# Patient Record
Sex: FEMALE | Race: WHITE
[De-identification: names, ages, dates, MRNs, and addresses within clinical notes are randomized per-mention and may not be internally consistent; named-entity substitution may affect disease eponyms.]

---

## 2021-12-07 ENCOUNTER — HOSPITAL ENCOUNTER (EMERGENCY)
Dept: HOSPITAL 19 - COL.ER | Age: 46
End: 2021-12-07
Attending: EMERGENCY MEDICINE
Payer: COMMERCIAL

## 2021-12-07 VITALS — WEIGHT: 195.33 LBS | HEIGHT: 67.01 IN | BODY MASS INDEX: 30.66 KG/M2

## 2021-12-07 VITALS — HEART RATE: 82 BPM | DIASTOLIC BLOOD PRESSURE: 86 MMHG | SYSTOLIC BLOOD PRESSURE: 143 MMHG

## 2021-12-07 VITALS — TEMPERATURE: 98.4 F

## 2021-12-07 DIAGNOSIS — R55: Primary | ICD-10-CM

## 2021-12-07 LAB
ALBUMIN SERPL-MCNC: 3.9 GM/DL (ref 3.5–5)
ALP SERPL-CCNC: 65 U/L (ref 40–150)
ALT SERPL-CCNC: 30 U/L (ref 0–55)
ANION GAP SERPL CALC-SCNC: 9 MMOL/L (ref 7–16)
AST SERPL-CCNC: 18 U/L (ref 5–34)
BASOPHILS # BLD: 0 K/MM3 (ref 0–0.2)
BASOPHILS NFR BLD AUTO: 0.3 % (ref 0–2)
BILIRUB SERPL-MCNC: 0.3 MG/DL (ref 0.2–1.2)
BUN SERPL-MCNC: 14 MG/DL (ref 7–19)
CALCIUM SERPL-MCNC: 8.6 MG/DL (ref 8.4–10.2)
CHLORIDE SERPL-SCNC: 106 MMOL/L (ref 98–107)
CO2 SERPL-SCNC: 25 MMOL/L (ref 22–29)
CREAT SERPL-SCNC: 0.73 MG/DL (ref 0.57–1.11)
EOSINOPHIL # BLD: 0.1 K/MM3 (ref 0–0.7)
EOSINOPHIL NFR BLD: 1.5 % (ref 0–4)
ERYTHROCYTE [DISTWIDTH] IN BLOOD BY AUTOMATED COUNT: 13.6 % (ref 11.5–14.5)
GLUCOSE SERPL-MCNC: 89 MG/DL (ref 70–99)
GRANULOCYTES # BLD AUTO: 61 % (ref 42.2–75.2)
HCT VFR BLD AUTO: 38.1 % (ref 37–47)
HGB BLD-MCNC: 12.6 G/DL (ref 12.5–16)
LYMPHOCYTES # BLD: 2.7 K/MM3 (ref 1.2–3.4)
LYMPHOCYTES NFR BLD: 29.4 % (ref 20–51)
MCH RBC QN AUTO: 30 PG (ref 27–31)
MCHC RBC AUTO-ENTMCNC: 33 G/DL (ref 33–37)
MCV RBC AUTO: 89 FL (ref 80–100)
MONOCYTES # BLD: 0.7 K/MM3 (ref 0.1–0.6)
MONOCYTES NFR BLD AUTO: 7.6 % (ref 1.7–9.3)
NEUTROPHILS # BLD: 5.7 K/MM3 (ref 1.4–6.5)
PLATELET # BLD AUTO: 293 K/MM3 (ref 130–400)
PMV BLD AUTO: 10.1 FL (ref 7.4–10.4)
POTASSIUM SERPL-SCNC: 4.2 MMOL/L (ref 3.5–4.5)
PROT SERPL-MCNC: 7.3 GM/DL (ref 6.2–8.1)
RBC # BLD AUTO: 4.27 M/MM3 (ref 4.1–5.3)
SODIUM SERPL-SCNC: 140 MMOL/L (ref 136–145)

## 2022-09-19 ENCOUNTER — HOSPITAL ENCOUNTER (OUTPATIENT)
Dept: HOSPITAL 19 - MC.RAD | Age: 47
End: 2022-09-19
Attending: FAMILY MEDICINE
Payer: COMMERCIAL

## 2022-09-19 DIAGNOSIS — N63.25: Primary | ICD-10-CM

## 2022-09-28 ENCOUNTER — HOSPITAL ENCOUNTER (OUTPATIENT)
Dept: HOSPITAL 19 - MC.RAD | Age: 47
End: 2022-09-28
Attending: FAMILY MEDICINE
Payer: COMMERCIAL

## 2022-09-28 DIAGNOSIS — N63.20: Primary | ICD-10-CM

## 2024-05-08 ENCOUNTER — HOSPITAL ENCOUNTER (OUTPATIENT)
Dept: HOSPITAL 19 - MC.RAD | Age: 49
End: 2024-05-08
Payer: COMMERCIAL

## 2024-05-08 DIAGNOSIS — Z12.31: Primary | ICD-10-CM

## 2024-06-12 ENCOUNTER — HOSPITAL ENCOUNTER (OUTPATIENT)
Dept: HOSPITAL 19 - SDCO | Age: 49
Discharge: HOME | End: 2024-06-12
Attending: SURGERY
Payer: COMMERCIAL

## 2024-06-12 DIAGNOSIS — K80.10: Primary | ICD-10-CM

## 2024-06-12 DIAGNOSIS — Z53.9: ICD-10-CM

## 2024-06-12 NOTE — NUR
MEDICATION LIST REVIEWED WITH THE PATIENT.  PATIENT TOOK PHENTERMINE THIS AM
AND HAD NOT BEEN INSTRUCTED TO STOP THIS MEDICATION.  KAREN GOMEZ CRNA WAS
NOTIFIED AND WILL COME AND TALK WITH THE PATIENT.
 
1230 DECISION WAS MADE BY DR. TREJO AND KAREN GOMEZ CRNA TO CANCEL THE
PATIENT D/T WEIGHT LOSS DRUG BEING TAKEN.  PATIENT WAS INFORMED BY ANESTHESIA.
PATIENT AND SPOUSE BOTH VERBALZIE UNDERSTANDING OF THIS.  DR. TREJO WILL
COME AND TALK WITH PATIENT AFTER FINISHING CASE IN THE SURGERY.

## 2024-06-12 NOTE — NUR
DR. TREJO IN THE ROOM AND SURGERY CANCELLED FOR TODAY.  PATIENT WAS
DISCHARGED TO HOME AND INFORMED THE OFFICE WILL CALL WITH RESCHEDULE DATE.

## 2024-06-21 ENCOUNTER — HOSPITAL ENCOUNTER (OUTPATIENT)
Dept: HOSPITAL 19 - SDCO | Age: 49
Discharge: HOME | End: 2024-06-21
Attending: SURGERY
Payer: COMMERCIAL

## 2024-06-21 VITALS — HEIGHT: 67 IN | WEIGHT: 210.54 LBS | BODY MASS INDEX: 33.04 KG/M2

## 2024-06-21 VITALS — DIASTOLIC BLOOD PRESSURE: 64 MMHG | TEMPERATURE: 97.6 F | HEART RATE: 53 BPM | SYSTOLIC BLOOD PRESSURE: 120 MMHG

## 2024-06-21 VITALS — DIASTOLIC BLOOD PRESSURE: 79 MMHG | SYSTOLIC BLOOD PRESSURE: 117 MMHG | HEART RATE: 60 BPM

## 2024-06-21 VITALS — SYSTOLIC BLOOD PRESSURE: 129 MMHG | DIASTOLIC BLOOD PRESSURE: 85 MMHG | HEART RATE: 65 BPM

## 2024-06-21 VITALS — DIASTOLIC BLOOD PRESSURE: 77 MMHG | TEMPERATURE: 98.1 F | HEART RATE: 63 BPM | SYSTOLIC BLOOD PRESSURE: 121 MMHG

## 2024-06-21 DIAGNOSIS — K21.9: ICD-10-CM

## 2024-06-21 DIAGNOSIS — K80.10: Primary | ICD-10-CM

## 2024-06-21 DIAGNOSIS — Z79.899: ICD-10-CM

## 2024-06-21 NOTE — NUR
PATIENT WAS ADMITTED TO ROOM 5 AMBULATORY AND IS ALERT AND ORIENTED X3.
VOICED UNDERSTANDING OF SURGERY AND CONSENT SIGNED.  URINE HCG WAS OBTAINED.
SPOUSE IN ROOM.  CALL LIGHT IN REACH.

## 2024-06-21 NOTE — NUR
1520: DISCHARGE TEACHING COMPLETED WITH PRINTED EDUCATION AND INSTRUCTIONS
SENT HOME WITH PATIENT AND SPOUSE. FOLLOW UP APPOINTMENT DATE, TIME AND
LOCATION COMMUNICATED TO PATIENT AND SPOUSE. BOTH VERBALIZED UNDERSTANDING.
1522: PATIENT WALKED IN HALLWAY WITH STEADY GAIT.
1526: PATIENT VOIDED WITHOUT DIFFICULTY IN RESTROOM.
 
1530: ALL 4 ABDOMINAL BANDAIDS CLEAN, DRY AND INTACT. PATIENT DENIES PAIN,
NAUSEA AND SHORTNESS OF BREATH. REQUESTS TO BE DISCHARGED HOME AT THIS TIME.
IV REMOVED. GAUZE AND COBAN PLACED OVER SITE.
1533: PATIENT DISCHARGED HOME WITH SPOUSE AS TRANSPORT.

## 2024-06-21 NOTE — NUR
PATIENT RETURNED TO ROOM VIA CART, ALERT AND ORIENTED X3. PATIENT IS PLEASANT
AND DENIES PAIN, NAUSEA AND SHORTNESS OF BREATH. BREATHING REGULAR AND
UNLABORED ON ROOM AIR. SKIN WARM AND DRY. NURSE HANDOFF COMPLETED IN ROOM WITH
INSPECTION OF SURGICAL SITES. 4 BANDAIDS PRESENT TO ABDOMEN. ALL 4 BANDAIDS
CLEAN, DRY AND INTACT. SURROUNDING SKIN INTACT, NO REDNESS.
PATIENT HAS NO COMPLAINTS. SPOUSE PRESENT IN ROOM.
PATIENT HAD WATER AND A POPSICLE, NO DYSPHAGIA. CALL LIGHT IN REACH.